# Patient Record
Sex: MALE | Race: BLACK OR AFRICAN AMERICAN | Employment: UNEMPLOYED | URBAN - METROPOLITAN AREA
[De-identification: names, ages, dates, MRNs, and addresses within clinical notes are randomized per-mention and may not be internally consistent; named-entity substitution may affect disease eponyms.]

---

## 2018-11-27 ENCOUNTER — HOSPITAL ENCOUNTER (EMERGENCY)
Age: 23
Discharge: HOME | End: 2018-11-27
Attending: EMERGENCY MEDICINE
Payer: SELF-PAY

## 2018-11-27 VITALS
TEMPERATURE: 98.6 F | SYSTOLIC BLOOD PRESSURE: 119 MMHG | DIASTOLIC BLOOD PRESSURE: 84 MMHG | HEIGHT: 78 IN | BODY MASS INDEX: 17.36 KG/M2 | HEART RATE: 74 BPM | OXYGEN SATURATION: 99 % | RESPIRATION RATE: 18 BRPM | WEIGHT: 150 LBS

## 2018-11-27 DIAGNOSIS — K02.9 DENTAL CARIES: ICD-10-CM

## 2018-11-27 DIAGNOSIS — K08.89 PAIN, DENTAL: Primary | ICD-10-CM

## 2018-11-27 DIAGNOSIS — R22.0 SWELLING OF GUMS: ICD-10-CM

## 2018-11-27 PROCEDURE — 99282 EMERGENCY DEPT VISIT SF MDM: CPT

## 2018-11-27 RX ORDER — TRAMADOL HYDROCHLORIDE 50 MG/1
50 TABLET ORAL
Qty: 9 TAB | Refills: 0 | Status: SHIPPED | OUTPATIENT
Start: 2018-11-27 | End: 2020-02-13

## 2018-11-27 RX ORDER — AMOXICILLIN 500 MG/1
500 TABLET, FILM COATED ORAL 3 TIMES DAILY
Qty: 30 TAB | Refills: 0 | Status: SHIPPED | OUTPATIENT
Start: 2018-11-27 | End: 2020-02-13

## 2018-11-27 RX ORDER — LIDOCAINE HYDROCHLORIDE 20 MG/ML
15 SOLUTION OROPHARYNGEAL AS NEEDED
Qty: 1 BOTTLE | Refills: 0 | Status: SHIPPED | OUTPATIENT
Start: 2018-11-27 | End: 2020-02-13

## 2018-11-27 RX ORDER — IBUPROFEN 800 MG/1
800 TABLET ORAL
Qty: 20 TAB | Refills: 0 | Status: SHIPPED | OUTPATIENT
Start: 2018-11-27 | End: 2018-12-04

## 2018-11-27 NOTE — DISCHARGE INSTRUCTIONS
Tooth Decay: Care Instructions  Your Care Instructions    Tooth decay is damage to a tooth caused by plaque. Plaque is a thin film of bacteria that sticks to the teeth above and below the gum line. If plaque isn't removed from the teeth, it can build up and harden into tartar. The bacteria in plaque and tartar use sugars in food to make acids. These acids can cause tooth decay and gum disease. Any part of your tooth can decay, from the roots below the gum line to the chewing surface. Decay can affect the outer layer (enamel) or inner layer (dentin) of your teeth. The deeper the decay, the worse the damage. Untreated tooth decay will get worse and may lead to tooth loss. If you have a small hole (cavity) in your tooth, your dentist can repair it by removing the decay and filling the hole. If you have deeper decay, you may need more treatment. A very badly damaged tooth may have to be removed. Follow-up care is a key part of your treatment and safety. Be sure to make and go to all appointments, and call your dentist if you are having problems. It's also a good idea to know your test results and keep a list of the medicines you take. How can you care for yourself at home? If you have pain:  · Take an over-the-counter pain medicine, such as acetaminophen (Tylenol), ibuprofen (Advil, Motrin), or naproxen (Aleve). Be safe with medicines. Read and follow all instructions on the label. ? Do not take two or more pain medicines at the same time unless the doctor told you to. Many pain medicines have acetaminophen, which is Tylenol. Too much acetaminophen (Tylenol) can be harmful. · Put ice or a cold pack on your cheek over the tooth for 10 to 15 minutes at a time. Put a thin cloth between the ice and your skin. To prevent tooth decay  · Brush teeth twice a day, and floss once a day. Brushing with fluoride toothpaste and flossing may be enough to reverse early decay.   · Use a toothbrush with soft, rounded-end bristles and a head that is small enough to reach all parts of your teeth and mouth. Replace your toothbrush every 3 or 4 months. You may also use an electric toothbrush that has rotating and oscillating (back-and-forth) action. · Ask your dentist about having fluoride treatments at the dental office. · Brush your tongue to help get rid of bacteria. · Eat healthy foods that include whole grains, vegetables, and fruits. · Have your teeth cleaned by a professional at least two times a year. · Do not smoke or use smokeless tobacco. Tobacco can make tooth decay worse. When should you call for help? Call 911 anytime you think you may need emergency care. For example, call if:    · You have trouble breathing.    Call your dentist now or seek immediate medical care if:    · You have new or worse symptoms of infection, such as:  ? Increased pain, swelling, warmth, or redness. ? Red streaks leading from the area. ? Pus draining from the area. ? A fever.    Watch closely for changes in your health, and be sure to contact your doctor if:    · You do not get better as expected. Where can you learn more? Go to http://jenniffer-kimberly.info/. Enter C364 in the search box to learn more about \"Tooth Decay: Care Instructions. \"  Current as of: March 28, 2018  Content Version: 11.8  © 4894-5234 [employer]. Care instructions adapted under license by [employer] (which disclaims liability or warranty for this information). If you have questions about a medical condition or this instruction, always ask your healthcare professional. [place] [address] any warranty or liability for your use of this information. Tooth and Gum Pain: Care Instructions  Your Care Instructions    The most common causes of dental pain are tooth decay and gum disease. Pain can also be caused by an infection of the tooth (abscess) or the gums.  Or you may have pain from a broken or cracked tooth. Other causes of pain include infection and damage to a tooth from nervous grinding of your teeth. A wisdom tooth can be painful when it is coming in but cannot break through the gum. It can also be painful when the tooth is only partway in and extra gum tissue has formed around it. The tissue can get inflamed (pericoronitis), and sometimes it gets infected. Prompt dental care can help find the cause of your toothache and keep the tooth from dying or gum disease from getting worse. Self-care at home may reduce your pain and discomfort. Follow-up care is a key part of your treatment and safety. Be sure to make and go to all appointments, and call your dentist or doctor if you are having problems. It's also a good idea to know your test results and keep a list of the medicines you take. How can you care for yourself at home? · To reduce pain and facial swelling, put an ice or cold pack on the outside of your cheek for 10 to 20 minutes at a time. Put a thin cloth between the ice and your skin. Do not use heat. · If your doctor prescribed antibiotics, take them as directed. Do not stop taking them just because you feel better. You need to take the full course of antibiotics. · Ask your doctor if you can take an over-the-counter pain medicine, such as acetaminophen (Tylenol), ibuprofen (Advil, Motrin), or naproxen (Aleve). Be safe with medicines. Read and follow all instructions on the label. · Avoid very hot, cold, or sweet foods and drinks if they increase your pain. · Rinse your mouth with warm salt water every 2 hours to help relieve pain and swelling. Mix 1 teaspoon of salt in 8 ounces of water. · Talk to your dentist about using special toothpaste for sensitive teeth. To reduce pain on contact with heat or cold or when brushing, brush with this toothpaste regularly or rub a small amount of the paste on the sensitive area with a clean finger 2 or 3 times a day.  Floss gently between your teeth.  · Do not smoke or use spit tobacco. Tobacco use can make gum problems worse, decreases your ability to fight infection in your gums, and delays healing. If you need help quitting, talk to your doctor about stop-smoking programs and medicines. These can increase your chances of quitting for good. When should you call for help? Call 911 anytime you think you may need emergency care. For example, call if:    · You have trouble breathing.    Call your dentist or doctor now or seek immediate medical care if:    · You have signs of infection, such as:  ? Increased pain, swelling, warmth, or redness. ? Red streaks leading from the area. ? Pus draining from the area. ? A fever.    Watch closely for changes in your health, and be sure to contact your doctor if:    · You do not get better as expected. Where can you learn more? Go to http://jenniffer-kimberly.info/. Enter 0363 [id number] in the search box to learn more about \"Tooth and Gum Pain: Care Instructions. \"  Current as of: March 28, 2018  Content Version: 11.8  © 6135-7880 [employer]. Care instructions adapted under license by [employer] (which disclaims liability or warranty for this information). If you have questions about a medical condition or this instruction, always ask your healthcare professional. [place] [address] any warranty or liability for your use of this information.

## 2018-11-27 NOTE — ED PROVIDER NOTES
EMERGENCY DEPARTMENT HISTORY AND PHYSICAL EXAM 
 
Date: 11/27/2018 Patient Name: Liliana Pacheco History of Presenting Illness Chief Complaint Patient presents with  Dental Pain History Provided By: Patient Chief Complaint: dental pain Duration: 1 month Timing:  Acute, Progressive and Worsening Location: left lower Additional History (Context): Liliana Pacheco is a 23 y.o. male with No significant past medical history who presents with C/O left lower dental pain for the past month that has been getting worse. Has been taking over the counter Motrin but that has stopped working. States that he now has pain radiating into the head and ear on the left side. No drooling, no fevers, no chills,  No difficulty swallowing. Has a dentist appt scheduled in 2 days with [facility]. PCP: None Current Outpatient Medications Medication Sig Dispense Refill  amoxicillin 500 mg tab Take 500 mg by mouth three (3) times daily. 30 Tab 0  
 traMADol (ULTRAM) 50 mg tablet Take 1 Tab by mouth every eight (8) hours as needed for Pain. Max Daily Amount: 150 mg. 9 Tab 0  
 lidocaine (LIDOCAINE VISCOUS) 2 % solution Take 15 mL by mouth as needed for Pain. 1 Bottle 0  
 ibuprofen (MOTRIN) 800 mg tablet Take 1 Tab by mouth every six (6) hours as needed for Pain for up to 7 days. 20 Tab 0 Past History Past Medical History: 
History reviewed. No pertinent past medical history. Past Surgical History: 
History reviewed. No pertinent surgical history. Family History: 
History reviewed. No pertinent family history. Social History: 
Social History Tobacco Use  Smoking status: Not on file Substance Use Topics  Alcohol use: Not on file  Drug use: Not on file Allergies: 
No Known Allergies Review of Systems Review of Systems Constitutional: Negative for chills and fever. HENT: Positive for dental problem. Negative for drooling. Respiratory: Negative for cough, chest tightness and wheezing. Cardiovascular: Negative. Gastrointestinal: Negative. Genitourinary: Negative for dysuria, flank pain and frequency. Neurological: Positive for headaches. All other systems reviewed and are negative. Physical Exam  
 
Vitals:  
 11/27/18 1437 BP: 119/84 Pulse: 74 Resp: 18 Temp: 98.6 °F (37 °C) SpO2: 99% Weight: 68 kg (150 lb) Height: 6' 6\" (1.981 m) Physical Exam  
Constitutional: He is oriented to person, place, and time. He appears well-developed and well-nourished. No distress. HENT:  
Head: Normocephalic and atraumatic. Right Ear: Hearing, tympanic membrane, external ear and ear canal normal.  
Left Ear: Hearing, tympanic membrane, external ear and ear canal normal.  
Nose: Nose normal.  
Mouth/Throat: Uvula is midline, oropharynx is clear and moist and mucous membranes are normal. No trismus in the jaw. Dental caries present. No dental abscesses or uvula swelling. Tooth #17 is eroded down into the gum line with noted dental caries throughout. There is no marylu dental abscess. No Arpan's angina. No drooling, Airway is grossly patent. Eyes: EOM are normal. Pupils are equal, round, and reactive to light. Neck: Neck supple. Cardiovascular: Normal rate and regular rhythm. Exam reveals no gallop and no friction rub. No murmur heard. Pulmonary/Chest: Effort normal and breath sounds normal. No respiratory distress. He has no wheezes. He has no rales. Abdominal: Soft. Bowel sounds are normal. He exhibits no distension and no mass. There is no tenderness. There is no rebound and no guarding. Musculoskeletal: Normal range of motion. He exhibits no tenderness or deformity. Lymphadenopathy:  
  He has no cervical adenopathy. Neurological: He is alert and oriented to person, place, and time. Skin: Skin is warm and dry. No rash noted. He is not diaphoretic. Psychiatric: He has a normal mood and affect. His behavior is normal.  
Nursing note and vitals reviewed. Diagnostic Study Results Labs - No results found for this or any previous visit (from the past 12 hour(s)). Radiologic Studies - No orders to display CT Results  (Last 48 hours) None CXR Results  (Last 48 hours) None Medical Decision Making I am the first provider for this patient. I reviewed the vital signs, available nursing notes, past medical history, past surgical history, family history and social history. Vital Signs-Reviewed the patient's vital signs. Records Reviewed: Nursing Notes Procedures: 
Procedures Provider Notes (Medical Decision Making): Impression:  Dental caries, dental pain. No marylu abscess, but gums around the tooth #17 are edematous and painful. Plan to start on Abx, pain meds, encouraged patient to keep his dental appt with Arzella Grade for Thursday. DIONI Monsivais 
 
MED RECONCILIATION: 
No current facility-administered medications for this encounter. Current Outpatient Medications Medication Sig  
 amoxicillin 500 mg tab Take 500 mg by mouth three (3) times daily.  traMADol (ULTRAM) 50 mg tablet Take 1 Tab by mouth every eight (8) hours as needed for Pain. Max Daily Amount: 150 mg.  
 lidocaine (LIDOCAINE VISCOUS) 2 % solution Take 15 mL by mouth as needed for Pain.  ibuprofen (MOTRIN) 800 mg tablet Take 1 Tab by mouth every six (6) hours as needed for Pain for up to 7 days. Disposition: 
discharged DISCHARGE NOTE:  
 
Pt has been reexamined. Patient has no new complaints, changes, or physical findings. Care plan outlined and precautions discussed. Results were reviewed with the patient. All medications were reviewed with the patient; will d/c home with tramadol, Motrin, viscous lidocaine, and Amoxcillin  All of pt's questions and concerns were addressed.  Patient was instructed and agrees to follow up with [facility], as well as to return to the ED upon further deterioration. Patient is ready to go home. Follow-up Information Follow up With Specialties Details Why Contact Info [id number] [facility] EMERGENCY DEPT Emergency Medicine  If symptoms worsen [place] [id number] [phone] Edwige Ohara  In 2 days  [address] [address] 
[phone] Current Discharge Medication List  
  
START taking these medications Details  
amoxicillin 500 mg tab Take 500 mg by mouth three (3) times daily. Qty: 30 Tab, Refills: 0  
  
traMADol (ULTRAM) 50 mg tablet Take 1 Tab by mouth every eight (8) hours as needed for Pain. Max Daily Amount: 150 mg. 
Qty: 9 Tab, Refills: 0 Associated Diagnoses: Pain, dental; Dental caries  
  
lidocaine (LIDOCAINE VISCOUS) 2 % solution Take 15 mL by mouth as needed for Pain. Qty: 1 Bottle, Refills: 0  
  
ibuprofen (MOTRIN) 800 mg tablet Take 1 Tab by mouth every six (6) hours as needed for Pain for up to 7 days. Qty: 20 Tab, Refills: 0 Diagnosis Clinical Impression: 1. Pain, dental   
2. Dental caries 3. Swelling of gums

## 2018-11-27 NOTE — ED TRIAGE NOTES
Pt presents with dental pain to left lower back of mouth x 1 month. States has dentist appointment on 11/29/18 for same.
no

## 2020-02-13 ENCOUNTER — HOSPITAL ENCOUNTER (EMERGENCY)
Age: 25
Discharge: HOME | End: 2020-02-13
Attending: EMERGENCY MEDICINE
Payer: SELF-PAY

## 2020-02-13 ENCOUNTER — APPOINTMENT (OUTPATIENT)
Dept: GENERAL RADIOLOGY | Age: 25
End: 2020-02-13
Attending: PHYSICIAN ASSISTANT
Payer: SELF-PAY

## 2020-02-13 VITALS
RESPIRATION RATE: 16 BRPM | HEART RATE: 90 BPM | TEMPERATURE: 98.3 F | OXYGEN SATURATION: 100 % | DIASTOLIC BLOOD PRESSURE: 72 MMHG | SYSTOLIC BLOOD PRESSURE: 110 MMHG

## 2020-02-13 DIAGNOSIS — J06.9 UPPER RESPIRATORY TRACT INFECTION, UNSPECIFIED TYPE: Primary | ICD-10-CM

## 2020-02-13 LAB
FLUAV AG NPH QL IA: NEGATIVE
FLUBV AG NOSE QL IA: NEGATIVE

## 2020-02-13 PROCEDURE — 99282 EMERGENCY DEPT VISIT SF MDM: CPT

## 2020-02-13 PROCEDURE — 71046 X-RAY EXAM CHEST 2 VIEWS: CPT

## 2020-02-13 PROCEDURE — 87804 INFLUENZA ASSAY W/OPTIC: CPT

## 2020-02-13 RX ORDER — GUAIFENESIN AND DEXTROMETHORPHAN HYDROBROMIDE 1200; 60 MG/1; MG/1
1 TABLET, EXTENDED RELEASE ORAL
Qty: 15 TAB | Refills: 0 | Status: SHIPPED | OUTPATIENT
Start: 2020-02-13

## 2020-02-13 RX ORDER — ONDANSETRON 4 MG/1
4 TABLET, ORALLY DISINTEGRATING ORAL
Qty: 10 TAB | Refills: 0 | Status: SHIPPED | OUTPATIENT
Start: 2020-02-13

## 2020-02-13 NOTE — ED TRIAGE NOTES
Patient c/o flu like symptoms since Friday. He has had cough, sore throat and generalized body aches.

## 2020-02-13 NOTE — LETTER
[address] [place] [address] [facility] EMERGENCY DEPT 
[address] [facility] [id number] 
[phone] Work/School Note Date: 2/13/2020 To Whom It May concern: 
 
Pat Carver was seen and treated today in the emergency room by the following provider(s): 
Attending Provider: Arielle Vasquez MD 
Physician Assistant: DIONI Forman. Pat Carver may return to work on 2/15/2020. Sincerely, DIONI Arias

## 2020-02-13 NOTE — ED NOTES
Louise Aquino is a 30 y.o. male that was discharged in stable condition. The patients diagnosis, condition and treatment were explained to  patient and aftercare instructions were given. The patient verbalized understanding. Patient armband removed and shredded.

## 2020-02-13 NOTE — DISCHARGE INSTRUCTIONS
Patient Education        Upper Respiratory Infection (Cold): Care Instructions  Your Care Instructions    An upper respiratory infection, or URI, is an infection of the nose, sinuses, or throat. URIs are spread by coughs, sneezes, and direct contact. The common cold is the most frequent kind of URI. The flu and sinus infections are other kinds of URIs. Almost all URIs are caused by viruses. Antibiotics won't cure them. But you can treat most infections with home care. This may include drinking lots of fluids and taking over-the-counter pain medicine. You will probably feel better in 4 to 10 days. The doctor has checked you carefully, but problems can develop later. If you notice any problems or new symptoms, get medical treatment right away. Follow-up care is a key part of your treatment and safety. Be sure to make and go to all appointments, and call your doctor if you are having problems. It's also a good idea to know your test results and keep a list of the medicines you take. How can you care for yourself at home? · To prevent dehydration, drink plenty of fluids, enough so that your urine is light yellow or clear like water. Choose water and other caffeine-free clear liquids until you feel better. If you have kidney, heart, or liver disease and have to limit fluids, talk with your doctor before you increase the amount of fluids you drink. · Take an over-the-counter pain medicine, such as acetaminophen (Tylenol), ibuprofen (Advil, Motrin), or naproxen (Aleve). Read and follow all instructions on the label. · Before you use cough and cold medicines, check the label. These medicines may not be safe for young children or for people with certain health problems. · Be careful when taking over-the-counter cold or flu medicines and Tylenol at the same time. Many of these medicines have acetaminophen, which is Tylenol. Read the labels to make sure that you are not taking more than the recommended dose.  Too much acetaminophen (Tylenol) can be harmful. · Get plenty of rest.  · Do not smoke or allow others to smoke around you. If you need help quitting, talk to your doctor about stop-smoking programs and medicines. These can increase your chances of quitting for good. When should you call for help? Call 911 anytime you think you may need emergency care. For example, call if:    · You have severe trouble breathing.    Call your doctor now or seek immediate medical care if:    · You seem to be getting much sicker.     · You have new or worse trouble breathing.     · You have a new or higher fever.     · You have a new rash.    Watch closely for changes in your health, and be sure to contact your doctor if:    · You have a new symptom, such as a sore throat, an earache, or sinus pain.     · You cough more deeply or more often, especially if you notice more mucus or a change in the color of your mucus.     · You do not get better as expected. Where can you learn more? Go to http://jenniffer-kimberly.info/. Enter E521 in the search box to learn more about \"Upper Respiratory Infection (Cold): Care Instructions. \"  Current as of: June 9, 2019  Content Version: 12.2  © 8646-5427 [employer]. Care instructions adapted under license by [employer] (which disclaims liability or warranty for this information). If you have questions about a medical condition or this instruction, always ask your healthcare professional. [place] [address] any warranty or liability for your use of this information.

## 2020-02-14 NOTE — ED PROVIDER NOTES
EMERGENCY DEPARTMENT HISTORY AND PHYSICAL EXAM      Date: 2/13/2020  Patient Name: Pat Carver    History of Presenting Illness     Chief Complaint   Patient presents with    Cold Symptoms       History Provided By: Patient    HPI: Pat Carver, 30 y.o. male no significant PMHx presents ambulatory to the ED with cc of myalgias with associated productive cough x1 week. Patient reports 2 bouts of NBNB emesis today. Denies abdominal pain, diarrhea patient is been taking OTC cough medication with moderate relief of symptoms. Patient reports decreased appetite. Did not receive flu shot this year. Denies sore throat, ear pain. Son with similar sx. There are no other complaints, changes, or physical findings at this time. PCP: None    No current facility-administered medications on file prior to encounter. Current Outpatient Medications on File Prior to Encounter   Medication Sig Dispense Refill    [DISCONTINUED] amoxicillin 500 mg tab Take 500 mg by mouth three (3) times daily. 30 Tab 0    [DISCONTINUED] traMADol (ULTRAM) 50 mg tablet Take 1 Tab by mouth every eight (8) hours as needed for Pain. Max Daily Amount: 150 mg. 9 Tab 0    [DISCONTINUED] lidocaine (LIDOCAINE VISCOUS) 2 % solution Take 15 mL by mouth as needed for Pain. 1 Bottle 0       Past History     Past Medical History:  History reviewed. No pertinent past medical history. Past Surgical History:  History reviewed. No pertinent surgical history. Family History:  History reviewed. No pertinent family history. Social History:  Social History     Tobacco Use    Smoking status: Never Smoker    Smokeless tobacco: Never Used   Substance Use Topics    Alcohol use: No     Frequency: Never    Drug use: No       Allergies:  No Known Allergies      Review of Systems   Review of Systems   Constitutional: Negative for chills and fever. HENT: Negative for facial swelling.     Eyes: Negative for photophobia and visual disturbance. Respiratory: Positive for cough. Negative for shortness of breath. Cardiovascular: Negative for chest pain. Gastrointestinal: Positive for vomiting. Negative for abdominal pain and nausea. Genitourinary: Negative for flank pain. Musculoskeletal: Positive for myalgias. Skin: Negative for color change, pallor, rash and wound. Neurological: Negative for dizziness, weakness, light-headedness and headaches. All other systems reviewed and are negative. Physical Exam   Physical Exam  Vitals signs and nursing note reviewed. Constitutional:       General: He is not in acute distress. Appearance: He is well-developed. Comments: Patient well-appearing in NAD   HENT:      Head: Normocephalic and atraumatic. Ears:      Comments: TMs clear b/l     Mouth/Throat:      Comments: No tonsillar or pharyngeal erythema, swelling or exudate    Eyes:      Conjunctiva/sclera: Conjunctivae normal.   Cardiovascular:      Rate and Rhythm: Normal rate and regular rhythm. Heart sounds: Normal heart sounds. Pulmonary:      Effort: Pulmonary effort is normal. No respiratory distress. Breath sounds: Normal breath sounds. Comments: Lungs CTA  Not working to breathe  No intercostal retraction  Abdominal:      General: Bowel sounds are normal.      Palpations: Abdomen is soft. Tenderness: There is no abdominal tenderness. Comments: Abdomen soft, nontender  Nondistended   Musculoskeletal: Normal range of motion. Skin:     General: Skin is warm. Findings: No rash. Neurological:      Mental Status: He is alert and oriented to person, place, and time. Cranial Nerves: No cranial nerve deficit.    Psychiatric:         Behavior: Behavior normal.         Diagnostic Study Results     Labs -     Recent Results (from the past 12 hour(s))   INFLUENZA A & B AG (RAPID TEST)    Collection Time: 02/13/20 11:30 AM   Result Value Ref Range    Influenza A Antigen NEGATIVE  NEG Influenza B Antigen NEGATIVE  NEG         Radiologic Studies -   XR CHEST PA LAT   Final Result   IMPRESSION: Normal chest.        CT Results  (Last 48 hours)    None        CXR Results  (Last 48 hours)               02/13/20 1158  XR CHEST PA LAT Final result    Impression:  IMPRESSION: Normal chest.       Narrative:  EXAM: XR CHEST PA LAT       INDICATION: cough x 1 week, r/o PNA       COMPARISON: None. FINDINGS: PA and lateral radiographs of the chest demonstrate clear lungs. The   cardiac and mediastinal contours and pulmonary vascularity are normal. The bones   and soft tissues are within normal limits. Medical Decision Making   I am the first provider for this patient. I reviewed the vital signs, available nursing notes, past medical history, past surgical history, family history and social history. Vital Signs-Reviewed the patient's vital signs. Patient Vitals for the past 12 hrs:   Temp Pulse Resp BP SpO2   02/13/20 1119 98.3 °F (36.8 °C) 90 16 110/72 100 %         Records Reviewed: Nursing Notes and Old Medical Records    Provider Notes (Medical Decision Making):   DDx: URI, PNA, Bronchitis, Influenza    31 yo M with complaints of myalgias with associated cough x 1 week with assoc vomiting that started today. Negative for influenza. CXR negative for PNA. Patient tolerated p.o. fluids while in ED without patient nontoxic-appearing, afebrile, not tachycardic. Patient stable for outpatient management. ED Course:   Initial assessment performed. The patients presenting problems have been discussed, and they are in agreement with the care plan formulated and outlined with them. I have encouraged them to ask questions as they arise throughout their visit. Patient tolerated p.o. fluids without emesis while in ED. Disposition:  Discussed lab and imaging results with pt along with dx and treatment plan. Discussed importance of PCP follow up. All questions answered. Pt voiced they understood. Return if sx worsen. PLAN:  1. Discharge Medication List as of 2/13/2020  1:08 PM      START taking these medications    Details   dextromethorphan-guaiFENesin (MUCINEX DM) 60-1,200 mg Tb12 Take 1 Tab by mouth every twelve (12) hours as needed for Cough. , Print, Disp-15 Tab, R-0      ondansetron (ZOFRAN ODT) 4 mg disintegrating tablet Take 1 Tab by mouth every eight (8) hours as needed for Nausea or Vomiting., Print, Disp-10 Tab, R-0           2. Follow-up Information     Follow up With Specialties Details Why [id number] [facility] Schedule an appointment as soon as possible for a visit in 1 day  [address]  [phone]        Return to ED if worse     Diagnosis     Clinical Impression:   1. Upper respiratory tract infection, unspecified type        Attestations:    DIONI Blum    Please note that this dictation was completed with [employer], the computer voice recognition software. Quite often unanticipated grammatical, syntax, homophones, and other interpretive errors are inadvertently transcribed by the computer software. Please disregard these errors. Please excuse any errors that have escaped final proofreading. Thank you.